# Patient Record
Sex: FEMALE | Race: WHITE | HISPANIC OR LATINO | Employment: STUDENT | URBAN - METROPOLITAN AREA
[De-identification: names, ages, dates, MRNs, and addresses within clinical notes are randomized per-mention and may not be internally consistent; named-entity substitution may affect disease eponyms.]

---

## 2023-02-06 ENCOUNTER — OFFICE VISIT (OUTPATIENT)
Dept: URGENT CARE | Facility: CLINIC | Age: 28
End: 2023-02-06
Payer: MEDICAID

## 2023-02-06 ENCOUNTER — TELEPHONE (OUTPATIENT)
Dept: URGENT CARE | Facility: CLINIC | Age: 28
End: 2023-02-06
Payer: MEDICAID

## 2023-02-06 VITALS
SYSTOLIC BLOOD PRESSURE: 112 MMHG | WEIGHT: 160.69 LBS | HEART RATE: 65 BPM | OXYGEN SATURATION: 98 % | TEMPERATURE: 98 F | RESPIRATION RATE: 19 BRPM | DIASTOLIC BLOOD PRESSURE: 78 MMHG

## 2023-02-06 DIAGNOSIS — R09.A2 SENSATION OF FOREIGN BODY IN THROAT: Primary | ICD-10-CM

## 2023-02-06 PROCEDURE — 1160F RVW MEDS BY RX/DR IN RCRD: CPT | Mod: CPTII,S$GLB,, | Performed by: NURSE PRACTITIONER

## 2023-02-06 PROCEDURE — 3074F PR MOST RECENT SYSTOLIC BLOOD PRESSURE < 130 MM HG: ICD-10-PCS | Mod: CPTII,S$GLB,, | Performed by: NURSE PRACTITIONER

## 2023-02-06 PROCEDURE — 3078F DIAST BP <80 MM HG: CPT | Mod: CPTII,S$GLB,, | Performed by: NURSE PRACTITIONER

## 2023-02-06 PROCEDURE — 3078F PR MOST RECENT DIASTOLIC BLOOD PRESSURE < 80 MM HG: ICD-10-PCS | Mod: CPTII,S$GLB,, | Performed by: NURSE PRACTITIONER

## 2023-02-06 PROCEDURE — 1159F MED LIST DOCD IN RCRD: CPT | Mod: CPTII,S$GLB,, | Performed by: NURSE PRACTITIONER

## 2023-02-06 PROCEDURE — 70360 X-RAY EXAM OF NECK: CPT | Mod: FY,S$GLB,, | Performed by: RADIOLOGY

## 2023-02-06 PROCEDURE — 70360 XR NECK SOFT TISSUE: ICD-10-PCS | Mod: FY,S$GLB,, | Performed by: RADIOLOGY

## 2023-02-06 PROCEDURE — 1159F PR MEDICATION LIST DOCUMENTED IN MEDICAL RECORD: ICD-10-PCS | Mod: CPTII,S$GLB,, | Performed by: NURSE PRACTITIONER

## 2023-02-06 PROCEDURE — 1160F PR REVIEW ALL MEDS BY PRESCRIBER/CLIN PHARMACIST DOCUMENTED: ICD-10-PCS | Mod: CPTII,S$GLB,, | Performed by: NURSE PRACTITIONER

## 2023-02-06 PROCEDURE — 99204 PR OFFICE/OUTPT VISIT, NEW, LEVL IV, 45-59 MIN: ICD-10-PCS | Mod: S$GLB,,, | Performed by: NURSE PRACTITIONER

## 2023-02-06 PROCEDURE — 99204 OFFICE O/P NEW MOD 45 MIN: CPT | Mod: S$GLB,,, | Performed by: NURSE PRACTITIONER

## 2023-02-06 PROCEDURE — 3074F SYST BP LT 130 MM HG: CPT | Mod: CPTII,S$GLB,, | Performed by: NURSE PRACTITIONER

## 2023-02-07 NOTE — TELEPHONE ENCOUNTER
XR NECK SOFT TISSUE    Result Date: 2/6/2023  EXAMINATION: XR NECK SOFT TISSUE CLINICAL HISTORY: Foreign body of alimentary tract, part unspecified, initial encounter TECHNIQUE: AP and lateral soft tissue views the neck were performed. COMPARISON: None. FINDINGS: Single lateral view of the neck soft tissues appears negative for acute finding.  Absence of frontal view limits evaluation for subtle radiopaque foreign bodies.  Correlation with direct visualization would be helpful if there is strong clinical concern for symptomatic retained fishbone.  Epiglottis and prevertebral soft tissues are within expected limits.  Cervical spine is within expected limits.  No retropharyngeal soft tissue edema.     Limited evaluation of the neck soft tissues as described above. Electronically signed by: Stanley Black MD Date:    02/06/2023 Time:    20:21     Spoke with patient notified about x-ray results.  I recommended further evaluation if no improvement in symptoms.Patient voiced understanding and in agreement with current treatment plan.

## 2023-02-07 NOTE — PROGRESS NOTES
Subjective:       Patient ID: Helena Lopes is a 27 y.o. female.    Vitals:  weight is 72.9 kg (160 lb 11.5 oz). Her temperature is 97.8 °F (36.6 °C). Her blood pressure is 112/78 and her pulse is 65. Her respiration is 19 and oxygen saturation is 98%.     Chief Complaint: Swallowed Foreign Body    27 year old states she swallowed a fish bone last night while eating. Patient stated it feels like something is in her throat, patient reports she is able to eat and drink normal, denies fever, body aches or chills, denies cough, wheezing or shortness of breath, denies nausea, vomiting, diarrhea or abdominal pain, denies chest pain or dizziness positional lightheadedness, denies sore throat or trouble swallowing, denies loss of taste or smell, or any other symptoms        Swallowed Foreign Body  The incident occurred 12 to 24 hours ago. The foreign body is Food. The foreign body is suspected to be swallowed. The incident was witnessed/reported by The patient.   ROS    Objective:      Physical Exam   Constitutional: She is oriented to person, place, and time. She appears well-developed.   HENT:   Head: Normocephalic and atraumatic.   Ears:   Right Ear: External ear normal.   Left Ear: External ear normal.   Nose: Nose normal.   Mouth/Throat: Mucous membranes are normal.   Eyes: Conjunctivae and lids are normal.   Neck: Trachea normal. Neck supple.   Cardiovascular: Normal rate, regular rhythm and normal heart sounds.   Pulmonary/Chest: Effort normal and breath sounds normal. No respiratory distress.   Abdominal: Normal appearance and bowel sounds are normal. She exhibits no distension and no mass. Soft. There is no abdominal tenderness.   Musculoskeletal: Normal range of motion.         General: Normal range of motion.   Neurological: She is alert and oriented to person, place, and time. She has normal strength.   Skin: Skin is warm, dry, intact, not diaphoretic and not pale.   Psychiatric: Her speech is normal and behavior  is normal. Judgment and thought content normal.   Nursing note and vitals reviewed.      XR NECK SOFT TISSUE    Result Date: 2/6/2023  EXAMINATION: XR NECK SOFT TISSUE CLINICAL HISTORY: Foreign body of alimentary tract, part unspecified, initial encounter TECHNIQUE: AP and lateral soft tissue views the neck were performed. COMPARISON: None. FINDINGS: Single lateral view of the neck soft tissues appears negative for acute finding.  Absence of frontal view limits evaluation for subtle radiopaque foreign bodies.  Correlation with direct visualization would be helpful if there is strong clinical concern for symptomatic retained fishbone.  Epiglottis and prevertebral soft tissues are within expected limits.  Cervical spine is within expected limits.  No retropharyngeal soft tissue edema.     Limited evaluation of the neck soft tissues as described above. Electronically signed by: Stanley Black MD Date:    02/06/2023 Time:    20:21     Patient in no acute distress.  Vitals reassuring.  Discussed results/diagnosis/plan in depth with patient in clinic. Strict precautions given to patient to monitor for worsening signs and symptoms. Advised to follow up with primary.All questions answered. Strict ER precautions given. If your symptoms worsens or fail to improve you should go to the Emergency Room. Discharge and follow-up instructions given verbally/printed. Discharge and follow-up instructions discussed with the patient who expressed understanding and willingness to comply with my recommendations.Patient voiced understanding and in agreement with current treatment plan.     Please be advised this text was dictated with [employer] software and may contain errors due to translation.    Assessment:       1. Sensation of foreign body in throat          Plan:         Sensation of foreign body in throat  -     Cancel: X-Ray Abdomen Flat And Erect; Future; Expected date: 02/06/2023  -     XR NECK SOFT TISSUE; Future; Expected date:  02/06/2023           Medical Decision Making:   Clinical Tests:   Radiological Study: Ordered and Reviewed  Urgent Care Management:  Patient in no acute distress.  Vitals reassuring.  On exam, patient is nontoxic appearing and afebrile.  Lungs CTA.  Patient able to eat and drink normal.  Able to tolerate secretions and food normally.  No oropharyngeal erythema, edema or exudate noted.  Your Xray has not been read by a radiologist at this time. You will be notified if the report changes your plan of care. Appropriate action will be taken based on the reading.  Discussed with patient the x-ray results and advised follow-up if no improvement in symptoms.  Discussed with patient in detail.  Medication prescribed and over-the-counter medication discussed with patient at length.  Proper hydration advised.  I reiterated the importance of further evaluation if no improvement symptoms and follow-up with primary.       Patient Instructions   If your condition worsens or fails to improve we recommend that you receive another evaluation at the ER immediately or contact your PCP to discuss your concerns or return here. You must understand that you've received an urgent care treatment only and that you may be released before all your medical problems are known or treated. You the patient will arrange for followup care as instructed.    If you were prescribed antibiotics, please take them to completion.  If you were prescribed a narcotic medication, do not drive or operate heavy equipment or machinery while taking these medications.  Please follow up with your primary care doctor or specialist as needed.  If you  smoke, please stop smoking.